# Patient Record
Sex: MALE | Race: WHITE | Employment: FULL TIME | URBAN - METROPOLITAN AREA
[De-identification: names, ages, dates, MRNs, and addresses within clinical notes are randomized per-mention and may not be internally consistent; named-entity substitution may affect disease eponyms.]

---

## 2017-02-28 PROBLEM — Z79.899 HIGH RISK MEDICATION USE: Status: ACTIVE | Noted: 2017-02-28

## 2017-02-28 PROBLEM — Z79.899 ENCOUNTER FOR MONITORING SUBOXONE MAINTENANCE THERAPY: Status: ACTIVE | Noted: 2017-02-28

## 2017-02-28 PROBLEM — Z51.81 ENCOUNTER FOR MONITORING SUBOXONE MAINTENANCE THERAPY: Status: ACTIVE | Noted: 2017-02-28

## 2021-07-25 ENCOUNTER — APPOINTMENT (OUTPATIENT)
Dept: GENERAL RADIOLOGY | Age: 61
End: 2021-07-25
Payer: COMMERCIAL

## 2021-07-25 ENCOUNTER — HOSPITAL ENCOUNTER (EMERGENCY)
Age: 61
Discharge: HOME | End: 2021-07-26
Attending: EMERGENCY MEDICINE
Payer: COMMERCIAL

## 2021-07-25 DIAGNOSIS — M79.89 LEG SWELLING: Primary | ICD-10-CM

## 2021-07-25 LAB
ABSOLUTE EOS #: 0.1 K/UL (ref 0–0.4)
ABSOLUTE IMMATURE GRANULOCYTE: ABNORMAL K/UL (ref 0–0.3)
ABSOLUTE LYMPH #: 1.9 K/UL (ref 1–4.8)
ABSOLUTE MONO #: 1.5 K/UL (ref 0.1–1.3)
ALBUMIN SERPL-MCNC: 3.2 G/DL (ref 3.5–5.2)
ALBUMIN/GLOBULIN RATIO: ABNORMAL (ref 1–2.5)
ALP BLD-CCNC: 99 U/L (ref 40–129)
ALT SERPL-CCNC: 23 U/L (ref 5–41)
ANION GAP SERPL CALCULATED.3IONS-SCNC: 11 MMOL/L (ref 9–17)
AST SERPL-CCNC: 17 U/L
BASOPHILS # BLD: 1 % (ref 0–2)
BASOPHILS ABSOLUTE: 0.1 K/UL (ref 0–0.2)
BILIRUB SERPL-MCNC: 0.21 MG/DL (ref 0.3–1.2)
BNP INTERPRETATION: NORMAL
BUN BLDV-MCNC: 14 MG/DL (ref 8–23)
BUN/CREAT BLD: ABNORMAL (ref 9–20)
CALCIUM SERPL-MCNC: 8.8 MG/DL (ref 8.6–10.4)
CHLORIDE BLD-SCNC: 101 MMOL/L (ref 98–107)
CO2: 25 MMOL/L (ref 20–31)
CREAT SERPL-MCNC: 0.59 MG/DL (ref 0.7–1.2)
DIFFERENTIAL TYPE: ABNORMAL
EOSINOPHILS RELATIVE PERCENT: 1 % (ref 0–4)
GFR AFRICAN AMERICAN: >60 ML/MIN
GFR NON-AFRICAN AMERICAN: >60 ML/MIN
GFR SERPL CREATININE-BSD FRML MDRD: ABNORMAL ML/MIN/{1.73_M2}
GFR SERPL CREATININE-BSD FRML MDRD: ABNORMAL ML/MIN/{1.73_M2}
GLUCOSE BLD-MCNC: 112 MG/DL (ref 70–99)
HCT VFR BLD CALC: 34.1 % (ref 41–53)
HEMOGLOBIN: 11.5 G/DL (ref 13.5–17.5)
IMMATURE GRANULOCYTES: ABNORMAL %
LYMPHOCYTES # BLD: 19 % (ref 24–44)
MAGNESIUM: 1.8 MG/DL (ref 1.6–2.6)
MCH RBC QN AUTO: 31.2 PG (ref 26–34)
MCHC RBC AUTO-ENTMCNC: 33.6 G/DL (ref 31–37)
MCV RBC AUTO: 92.8 FL (ref 80–100)
MONOCYTES # BLD: 14 % (ref 1–7)
NRBC AUTOMATED: ABNORMAL PER 100 WBC
PDW BLD-RTO: 13.1 % (ref 11.5–14.9)
PLATELET # BLD: 514 K/UL (ref 150–450)
PLATELET ESTIMATE: ABNORMAL
PMV BLD AUTO: 7.4 FL (ref 6–12)
POTASSIUM SERPL-SCNC: 4.1 MMOL/L (ref 3.7–5.3)
PRO-BNP: 222 PG/ML
RBC # BLD: 3.68 M/UL (ref 4.5–5.9)
RBC # BLD: ABNORMAL 10*6/UL
SEG NEUTROPHILS: 65 % (ref 36–66)
SEGMENTED NEUTROPHILS ABSOLUTE COUNT: 6.8 K/UL (ref 1.3–9.1)
SODIUM BLD-SCNC: 137 MMOL/L (ref 135–144)
TOTAL PROTEIN: 6.7 G/DL (ref 6.4–8.3)
TROPONIN INTERP: NORMAL
TROPONIN T: NORMAL NG/ML
TROPONIN, HIGH SENSITIVITY: 7 NG/L (ref 0–22)
WBC # BLD: 10.5 K/UL (ref 3.5–11)
WBC # BLD: ABNORMAL 10*3/UL

## 2021-07-25 PROCEDURE — 99284 EMERGENCY DEPT VISIT MOD MDM: CPT

## 2021-07-25 PROCEDURE — 36415 COLL VENOUS BLD VENIPUNCTURE: CPT

## 2021-07-25 PROCEDURE — 83735 ASSAY OF MAGNESIUM: CPT

## 2021-07-25 PROCEDURE — 83880 ASSAY OF NATRIURETIC PEPTIDE: CPT

## 2021-07-25 PROCEDURE — 80053 COMPREHEN METABOLIC PANEL: CPT

## 2021-07-25 PROCEDURE — 84484 ASSAY OF TROPONIN QUANT: CPT

## 2021-07-25 PROCEDURE — 71045 X-RAY EXAM CHEST 1 VIEW: CPT

## 2021-07-25 PROCEDURE — 93005 ELECTROCARDIOGRAM TRACING: CPT | Performed by: STUDENT IN AN ORGANIZED HEALTH CARE EDUCATION/TRAINING PROGRAM

## 2021-07-25 PROCEDURE — 96372 THER/PROPH/DIAG INJ SC/IM: CPT

## 2021-07-25 PROCEDURE — 85025 COMPLETE CBC W/AUTO DIFF WBC: CPT

## 2021-07-25 PROCEDURE — 81003 URINALYSIS AUTO W/O SCOPE: CPT

## 2021-07-25 PROCEDURE — 6370000000 HC RX 637 (ALT 250 FOR IP): Performed by: STUDENT IN AN ORGANIZED HEALTH CARE EDUCATION/TRAINING PROGRAM

## 2021-07-25 RX ORDER — CYCLOBENZAPRINE HCL 10 MG
10 TABLET ORAL ONCE
Status: COMPLETED | OUTPATIENT
Start: 2021-07-25 | End: 2021-07-25

## 2021-07-25 RX ADMIN — CYCLOBENZAPRINE 10 MG: 10 TABLET, FILM COATED ORAL at 23:45

## 2021-07-25 ASSESSMENT — PAIN SCALES - GENERAL: PAINLEVEL_OUTOF10: 8

## 2021-07-25 ASSESSMENT — ENCOUNTER SYMPTOMS: SHORTNESS OF BREATH: 1

## 2021-07-26 ENCOUNTER — APPOINTMENT (OUTPATIENT)
Dept: CT IMAGING | Age: 61
End: 2021-07-26
Payer: COMMERCIAL

## 2021-07-26 ENCOUNTER — HOSPITAL ENCOUNTER (OUTPATIENT)
Dept: VASCULAR LAB | Age: 61
Discharge: HOME | End: 2021-07-26
Payer: COMMERCIAL

## 2021-07-26 VITALS
WEIGHT: 140 LBS | BODY MASS INDEX: 22.5 KG/M2 | DIASTOLIC BLOOD PRESSURE: 94 MMHG | HEART RATE: 116 BPM | OXYGEN SATURATION: 94 % | HEIGHT: 66 IN | RESPIRATION RATE: 22 BRPM | SYSTOLIC BLOOD PRESSURE: 146 MMHG | TEMPERATURE: 97.9 F

## 2021-07-26 LAB
BILIRUBIN URINE: NEGATIVE
COLOR: YELLOW
COMMENT UA: ABNORMAL
GLUCOSE URINE: NEGATIVE
KETONES, URINE: NEGATIVE
LEUKOCYTE ESTERASE, URINE: NEGATIVE
NITRITE, URINE: NEGATIVE
PH UA: 7.5 (ref 5–8)
PROTEIN UA: NEGATIVE
SPECIFIC GRAVITY UA: 1.04 (ref 1–1.03)
TROPONIN INTERP: NORMAL
TROPONIN T: NORMAL NG/ML
TROPONIN, HIGH SENSITIVITY: 7 NG/L (ref 0–22)
TURBIDITY: CLEAR
URINE HGB: NEGATIVE
UROBILINOGEN, URINE: NORMAL

## 2021-07-26 PROCEDURE — 71260 CT THORAX DX C+: CPT

## 2021-07-26 PROCEDURE — 2580000003 HC RX 258: Performed by: STUDENT IN AN ORGANIZED HEALTH CARE EDUCATION/TRAINING PROGRAM

## 2021-07-26 PROCEDURE — 6360000004 HC RX CONTRAST MEDICATION: Performed by: STUDENT IN AN ORGANIZED HEALTH CARE EDUCATION/TRAINING PROGRAM

## 2021-07-26 PROCEDURE — 93970 EXTREMITY STUDY: CPT

## 2021-07-26 PROCEDURE — 84484 ASSAY OF TROPONIN QUANT: CPT

## 2021-07-26 PROCEDURE — 36415 COLL VENOUS BLD VENIPUNCTURE: CPT

## 2021-07-26 PROCEDURE — 6360000002 HC RX W HCPCS: Performed by: STUDENT IN AN ORGANIZED HEALTH CARE EDUCATION/TRAINING PROGRAM

## 2021-07-26 RX ORDER — SODIUM CHLORIDE 0.9 % (FLUSH) 0.9 %
10 SYRINGE (ML) INJECTION PRN
Status: DISCONTINUED | OUTPATIENT
Start: 2021-07-26 | End: 2021-07-26 | Stop reason: HOSPADM

## 2021-07-26 RX ORDER — 0.9 % SODIUM CHLORIDE 0.9 %
80 INTRAVENOUS SOLUTION INTRAVENOUS ONCE
Status: COMPLETED | OUTPATIENT
Start: 2021-07-26 | End: 2021-07-26

## 2021-07-26 RX ADMIN — IOPAMIDOL 75 ML: 755 INJECTION, SOLUTION INTRAVENOUS at 00:27

## 2021-07-26 RX ADMIN — SODIUM CHLORIDE, PRESERVATIVE FREE 10 ML: 5 INJECTION INTRAVENOUS at 00:27

## 2021-07-26 RX ADMIN — ENOXAPARIN SODIUM 60 MG: 60 INJECTION SUBCUTANEOUS at 01:19

## 2021-07-26 RX ADMIN — SODIUM CHLORIDE 80 ML: 9 INJECTION, SOLUTION INTRAVENOUS at 00:27

## 2021-07-26 ASSESSMENT — ENCOUNTER SYMPTOMS
ABDOMINAL PAIN: 0
NAUSEA: 0
PHOTOPHOBIA: 0
VOMITING: 0

## 2021-07-26 NOTE — ED PROVIDER NOTES
[address] ED  Emergency Department Encounter  Emergency Medicine Resident     Pt Name: Thuan Williamson  MRN: [id number]  Silveriogfclary 1960  Date of evaluation: 7/25/21  PCP:  No primary care provider on file. CHIEF COMPLAINT       Chief Complaint   Patient presents with    Muscle Pain    Swelling       HISTORY OFPRESENT ILLNESS  (Location/Symptom, Timing/Onset, Context/Setting, Quality, Duration, Modifying Factors,Severity.)      Thuan Williamson is a 57 y. o.yo male who presents with upper and lower extremity muscle aches and swelling. Patient states that symptoms are predominantly on his left side. He noticed that symptoms times ongoing for weeks now however has progressively worsened. He denies any cardiac history. States he does not take any medication daily. He did state that he had shortness of breath couple of days ago, which is what led him to come to the emergency room for further work-up. Patient states that he did have pain over his left knee on the posterior side. He denies any chest pain, fever, chills, or recent weight loss. PAST MEDICAL / SURGICAL / SOCIAL / FAMILY HISTORY      has no past medical history on file. has no past surgical history on file.      Social History     Socioeconomic History    Marital status:      Spouse name: Not on file    Number of children: Not on file    Years of education: Not on file    Highest education level: Not on file   Occupational History    Not on file   Tobacco Use    Smoking status: Current Every Day Smoker   Substance and Sexual Activity    Alcohol use: Not on file    Drug use: Not on file    Sexual activity: Not on file   Other Topics Concern    Not on file   Social History Narrative    Not on file     Social Determinants of Health     Financial Resource Strain:     Difficulty of Paying Living Expenses:    Food Insecurity:     Worried About Running Out of Food in the Last Year:     [address] in the Last Year:    Transportation Needs:     Lack of Transportation (Medical):  Lack of Transportation (Non-Medical):    Physical Activity:     Days of Exercise per Week:     Minutes of Exercise per Session:    Stress:     Feeling of Stress :    Social Connections:     Frequency of Communication with Friends and Family:     Frequency of Social Gatherings with Friends and Family:     Attends Restoration Services:     Active Member of Clubs or Organizations:     Attends Club or Organization Meetings:     Marital Status:    Intimate Partner Violence:     Fear of Current or Ex-Partner:     Emotionally Abused:     Physically Abused:     Sexually Abused:        History reviewed. No pertinent family history. Allergies:  Patient has no known allergies. Home Medications:  Prior to Admission medications    Medication Sig Start Date End Date Taking? Authorizing Provider   buprenorphine-naloxone (SUBOXONE) 8-2 MG SUBL SL tablet Take 3/4 tablet under the tongue once daily as directed 4/26/17   Dayton Chester MD   [facility] 17 MCG/ACT inhaler  1/26/17   Historical Provider, MD       REVIEW OFSYSTEMS    (2-9 systems for level 4, 10 or more for level 5)      Review of Systems   Constitutional: Negative for fatigue and fever. Eyes: Negative for photophobia and visual disturbance. Respiratory: Positive for shortness of breath. Cardiovascular: Positive for leg swelling. Gastrointestinal: Negative for abdominal pain, nausea and vomiting. Genitourinary: Negative for dysuria, hematuria and urgency. Musculoskeletal: Negative for joint swelling and neck stiffness. Skin: Negative for rash and wound. Psychiatric/Behavioral: Negative for behavioral problems and confusion.        PHYSICAL EXAM   (up to 7 for level 4, 8 or more forlevel 5)      INITIAL VITALS:   ED Triage Vitals [07/1960]   BP Temp Temp Source Pulse Resp SpO2 Height Weight   136/70 97.9 °F (36.6 °C) Temporal 107 18 95 % 5' 6\" (1.676 consider pulmonary embolism. Will obtain CT PE study. Also do cardiac work-up including EKG, troponin. Will assess for new onset heart failure, although unlikely given that he only had unilateral leg swelling. Will obtain chest x-ray to assess for pneumonia given the shortness of breath. He complained of muscle weakness, will also obtain electrolytes level to assess for electrolyte imbalance. Patient disposition is pending his labs and image. DIAGNOSTIC RESULTS / EMERGENCYDEPARTMENT COURSE / MDM     LABS:  Labs Reviewed   CBC WITH AUTO DIFFERENTIAL - Abnormal; Notable for the following components:       Result Value    RBC 3.68 (*)     Hemoglobin 11.5 (*)     Hematocrit 34.1 (*)     Platelets 582 (*)     Lymphocytes 19 (*)     Monocytes 14 (*)     Absolute Mono # 1.50 (*)     All other components within normal limits   COMPREHENSIVE METABOLIC PANEL - Abnormal; Notable for the following components:    Glucose 112 (*)     CREATININE 0.59 (*)     Total Bilirubin 0.21 (*)     Albumin 3.2 (*)     All other components within normal limits   BRAIN NATRIURETIC PEPTIDE   MAGNESIUM   TROPONIN   URINALYSIS   TROPONIN         RADIOLOGY:  XR CHEST PORTABLE    Result Date: 7/25/2021  EXAMINATION: ONE XRAY VIEW OF THE CHEST 7/25/2021 10:19 pm COMPARISON: None. HISTORY: ORDERING SYSTEM PROVIDED HISTORY: shortness of breath TECHNOLOGIST PROVIDED HISTORY: shortness of breath Reason for Exam: shortness of breath, leg swelling Acuity: Acute Type of Exam: Initial Additional signs and symptoms: shortness of breath, leg swelling Relevant Medical/Surgical History: shortness of breath, leg swelling FINDINGS: Lungs are hyperaerated. Heart and mediastinum normal.  Bony thorax intact.      COPD     CT CHEST PULMONARY EMBOLISM W CONTRAST    Result Date: 7/26/2021  EXAMINATION: CTA OF THE CHEST 7/26/2021 12:01 am TECHNIQUE: CTA of the chest was performed after the administration of intravenous contrast.  Multiplanar reformatted images are provided for review. MIP images are provided for review. Dose modulation, iterative reconstruction, and/or weight based adjustment of the mA/kV was utilized to reduce the radiation dose to as low as reasonably achievable. COMPARISON: Chest portable July 25, 2021. HISTORY: ORDERING SYSTEM PROVIDED HISTORY: tachy with leg swelling TECHNOLOGIST PROVIDED HISTORY: tachy with leg swelling Decision Support Exception - unselect if not a suspected or confirmed emergency medical condition->Emergency Medical Condition (MA) Reason for Exam: Tachy with leg swelling Acuity: Unknown Type of Exam: Unknown Additional signs and symptoms: Pt c/o swelling and tightness all over for 3 weeks. FINDINGS: Pulmonary Arteries: Pulmonary arteries are adequately opacified for evaluation. No evidence of intraluminal filling defect to suggest pulmonary embolism. Main pulmonary artery is normal in caliber. Mediastinum: No evidence of mediastinal lymphadenopathy. The heart and pericardium demonstrate no acute abnormality. There is no acute abnormality of the thoracic aorta. Lungs/pleura: The lungs are without acute process. Bilateral upper lung moderate centrilobular emphysema is present. No focal consolidation or pulmonary edema. No evidence of pleural effusion or pneumothorax. Upper Abdomen: Limited images of the upper abdomen are unremarkable. Soft Tissues/Bones: No acute bone or soft tissue abnormality. No evidence of pulmonary embolism or acute pulmonary abnormality. Moderate bilateral upper lung centrilobular emphysema. EKG  EKG Interpretation    Interpreted by me    Rhythm: normal sinus   Rate: Tachycardia  Axis: normal  Ectopy: none  Conduction: normal  ST Segments: no acute change  T Waves: no acute change  Q Waves: none    Clinical Impression: Sinus tachycardia, . No ST elevation noted.     All EKG's are interpreted by the Emergency Department Physicianwho either signs or Co-signs this chart in the absence of a cardiologist.    EMERGENCY DEPARTMENT COURSE:  ED Course as of Jul 26 0116   Mon Jul 26, 2021   0050 CT Chest shows no pulmonary embolism. Chest X-ray negative for infiltrates. I discussed with patient that we do not have ultrasound tech to do Doppler studies to assess for DVT. However he can be treated with Lovenox and come back tomorrow to get Doppler studies. Patient agreed with plan. I also discussed with patient that because Lovenox is a blood thinner, he has to refrain from any activity that would increase his chances of falling and does hitting his head and thus having a brain bleed. Patient and significant other in the room expresses understanding.    [AN]      ED Course User Index  [AN] Reno Lal MD          PROCEDURES:  None    CONSULTS:  None    CRITICAL CARE:      FINAL IMPRESSION      1.  Leg swelling          DISPOSITION / PLAN     DISPOSITION Discharge - Pending Orders Complete 07/26/2021 12:55:01 AM      PATIENT REFERRED TO:  [facility] ED  [address]    Please return tomorrow for doppler studies      DISCHARGE MEDICATIONS:  New Prescriptions    No medications on file       Reno Lal MD  Emergency Medicine Resident    (Please note that portions of this note were completed with a voice recognition program.Efforts were made to edit the dictations but occasionally words are mis-transcribed.)        Reno Lal MD  Resident  07/26/21 8743

## 2021-07-26 NOTE — ED PROVIDER NOTES
[facility] ED     Emergency Department     Faculty Attestation        I performed a history and physical examination of the patient and discussed management with the resident. I reviewed the residents note and agree with the documented findings and plan of care. Any areas of disagreement are noted on the chart. I was personally present for the key portions of any procedures. I have documented in the chart those procedures where I was not present during the key portions. I have reviewed the emergency nurses triage note. I agree with the chief complaint, past medical history, past surgical history, allergies, medications, social and family history as documented unless otherwise noted below. Documentation of the HPI, Physical Exam and Medical Decision Making performed by medical students or scribes is based on my personal performance of the HPI, PE and MDM. For Physician Assistant/ Nurse Practitioner cases/documentation I have have had a face to face evaluation with this patient and have completed at least one if not all key elements of the E/M (history, physical exam, and MDM). Additional findings are as noted.     Pertinent Comments       (Please note that portions of this note were completed with a voice recognition program.  Efforts were made to edit the dictations but occasionally words are mis-transcribed.)    Vale Mccann DO  Attending Emergency Physician         Vale Mccann DO  07/25/21 4163

## 2021-07-28 LAB
EKG ATRIAL RATE: 105 BPM
EKG P AXIS: 77 DEGREES
EKG P-R INTERVAL: 126 MS
EKG Q-T INTERVAL: 340 MS
EKG QRS DURATION: 98 MS
EKG QTC CALCULATION (BAZETT): 449 MS
EKG R AXIS: 85 DEGREES
EKG T AXIS: 73 DEGREES
EKG VENTRICULAR RATE: 105 BPM

## 2021-07-28 PROCEDURE — 93010 ELECTROCARDIOGRAM REPORT: CPT | Performed by: INTERNAL MEDICINE

## 2021-08-21 ENCOUNTER — HOSPITAL ENCOUNTER (EMERGENCY)
Age: 61
Discharge: HOME | End: 2021-08-21
Attending: EMERGENCY MEDICINE
Payer: COMMERCIAL

## 2021-08-21 VITALS
RESPIRATION RATE: 18 BRPM | TEMPERATURE: 98.1 F | HEART RATE: 106 BPM | DIASTOLIC BLOOD PRESSURE: 74 MMHG | OXYGEN SATURATION: 97 % | SYSTOLIC BLOOD PRESSURE: 115 MMHG

## 2021-08-21 DIAGNOSIS — M13.0 POLYARTICULAR ARTHRITIS: Primary | ICD-10-CM

## 2021-08-21 PROCEDURE — 99283 EMERGENCY DEPT VISIT LOW MDM: CPT

## 2021-08-21 PROCEDURE — 6360000002 HC RX W HCPCS: Performed by: STUDENT IN AN ORGANIZED HEALTH CARE EDUCATION/TRAINING PROGRAM

## 2021-08-21 PROCEDURE — 6370000000 HC RX 637 (ALT 250 FOR IP): Performed by: STUDENT IN AN ORGANIZED HEALTH CARE EDUCATION/TRAINING PROGRAM

## 2021-08-21 RX ORDER — NAPROXEN 250 MG/1
250 TABLET ORAL 2 TIMES DAILY WITH MEALS
Qty: 180 TABLET | Refills: 1 | Status: SHIPPED | OUTPATIENT
Start: 2021-08-21

## 2021-08-21 RX ORDER — METHYLPREDNISOLONE 4 MG/1
4 TABLET ORAL ONCE
Status: COMPLETED | OUTPATIENT
Start: 2021-08-21 | End: 2021-08-21

## 2021-08-21 RX ORDER — METHYLPREDNISOLONE 4 MG/1
TABLET ORAL
Qty: 1 KIT | Refills: 0 | Status: SHIPPED | OUTPATIENT
Start: 2021-08-21 | End: 2021-08-27

## 2021-08-21 RX ORDER — ACETAMINOPHEN 500 MG
1000 TABLET ORAL 4 TIMES DAILY PRN
Qty: 30 TABLET | Refills: 1 | Status: SHIPPED | OUTPATIENT
Start: 2021-08-21

## 2021-08-21 RX ORDER — ACETAMINOPHEN 500 MG
1000 TABLET ORAL ONCE
Status: COMPLETED | OUTPATIENT
Start: 2021-08-21 | End: 2021-08-21

## 2021-08-21 RX ADMIN — ACETAMINOPHEN 1000 MG: 500 TABLET ORAL at 13:51

## 2021-08-21 RX ADMIN — METHYLPREDNISOLONE 4 MG: 4 TABLET ORAL at 14:47

## 2021-08-21 ASSESSMENT — PAIN SCALES - GENERAL
PAINLEVEL_OUTOF10: 9
PAINLEVEL_OUTOF10: 8

## 2021-08-21 ASSESSMENT — ENCOUNTER SYMPTOMS
SORE THROAT: 0
COUGH: 0
DIARRHEA: 0
ABDOMINAL PAIN: 0
SHORTNESS OF BREATH: 0
NAUSEA: 0
VOMITING: 0
RHINORRHEA: 0

## 2021-08-21 ASSESSMENT — PAIN - FUNCTIONAL ASSESSMENT: PAIN_FUNCTIONAL_ASSESSMENT: PREVENTS OR INTERFERES WITH MANY ACTIVE NOT PASSIVE ACTIVITIES

## 2021-08-21 ASSESSMENT — PAIN DESCRIPTION - FREQUENCY: FREQUENCY: CONTINUOUS

## 2021-08-21 ASSESSMENT — PAIN DESCRIPTION - PAIN TYPE: TYPE: ACUTE PAIN

## 2021-08-21 ASSESSMENT — PAIN DESCRIPTION - DESCRIPTORS: DESCRIPTORS: ACHING;SHOOTING

## 2021-08-21 NOTE — ED NOTES
Pt to ed with wife from home. Pt states for several weeks he has been dealing with hand, shoulder, knee, feet swelling and pain. Pt states he has been seen at several facilities without any answers, is established with new pcp and has upcoming appointment.  Pt states the swelling and pain are increasing and keeping him up at night so they came for a second opinion     Macrina Bermeo RN  08/21/21 8163

## 2021-08-21 NOTE — ED PROVIDER NOTES
[facility] ED     Emergency Department     Faculty Attestation    I performed a history and physical examination of the patient and discussed management with the resident. I reviewed the residents note and agree with the documented findings and plan of care. Any areas of disagreement are noted on the chart. I was personally present for the key portions of any procedures. I have documented in the chart those procedures where I was not present during the key portions. I have reviewed the emergency nurses triage note. I agree with the chief complaint, past medical history, past surgical history, allergies, medications, social and family history as documented unless otherwise noted below. For Physician Assistant/ Nurse Practitioner cases/documentation I have personally evaluated this patient and have completed at least one if not all key elements of the E/M (history, physical exam, and MDM). Additional findings are as noted. Patient presents with swelling to his right hand. He denies any injury. He says that just started over the past day or so. He says he has been having swelling and pain to multiple joints that will occur and then improve and then another joint will swell and have pain. He says this has been going on since July 5. He denies any recent fever, cough, chest pain, shortness breath, abdominal pain, vomiting or diarrhea. He denies any rashes. He denies any problems using the restroom. Patient says he does have an appointment with his primary care physician this week. On exam, patient is sitting on the side the bed and appears well. Lungs clear to auscultation bilaterally heart sounds are normal.  There is moderate edema to the right hand without erythema or warmth. I believe patient most likely has a rheumatologic process ongoing.   Will treat patient's pain here and prescribed a steroid burst.  Will have patient follow-up with his primary care physician for further work-up.       Summer Woods MD  Attending Emergency  Physician              Caron Au MD  08/21/21 9820

## 2021-08-21 NOTE — ED PROVIDER NOTES
[address]  ED  Emergency Department Encounter  Emergency Medicine Resident     Pt Name: Josh Bowers  MRN: [id number]  [place]ary 1960  Date of evaluation: 8/21/21  PCP:  KAT Salmeron CNP    CHIEF COMPLAINT       Chief Complaint   Patient presents with    Other     swelling in both hands, both shoulder and arm pain        HISTORY OFPRESENT ILLNESS  (Location/Symptom, Timing/Onset, Context/Setting, Quality, Duration, Modifying Factors,Severity.)      Johs Bowers is a 57 y.o. male who presents with right hand swelling, right shoulder pain, left hand swelling. The symptoms have been ongoing for the past month, initially began as foot/ankle pain and swelling in the joint pain and swelling have migrated for the past month. He was seen in urgent care approximately a week ago, was given steroids and his symptoms improved, however his right hand is now quite swollen and painful. He works with his hands and has been having to call out of work due to the pain. No known medical history other than COPD, she is newly diagnosed. He does have a follow-up appointment scheduled with his primary care provider as well as an orthopedic surgeon in the next week but is unable to tolerate the pain at this time. He denies fever, chills, chest pain, abdominal pain, nausea or vomiting. No dysuria or penile discharge. PAST MEDICAL / SURGICAL / SOCIAL / FAMILY HISTORY      has no past medical history on file. has no past surgical history on file. Social:  reports that he has been smoking. He does not have any smokeless tobacco history on file. Family Hx: No family history on file. Allergies:  Patient has no known allergies. Home Medications:  Prior to Admission medications    Medication Sig Start Date End Date Taking?  Authorizing Provider   methylPREDNISolone (MEDROL, ROLAND,) 4 MG tablet Take by mouth. 8/21/21 8/27/21 Yes Lee Bales MD   naproxen (NAPROSYN) 250 MG tablet Take 1 tablet by mouth 2 times daily (with meals) 8/21/21  Yes Kevin Lind MD   acetaminophen (TYLENOL) 500 MG tablet Take 2 tablets by mouth 4 times daily as needed for Pain 8/21/21  Yes Kevin Lind MD   buprenorphine-naloxone (SUBOXONE) 8-2 MG SUBL SL tablet Take 3/4 tablet under the tongue once daily as directed 4/26/17   Esdras Lira MD   [facility] 17 MCG/ACT inhaler  1/26/17   Historical Provider, MD       REVIEW OFSYSTEMS    (2-9 systems for level 4, 10 or more for level 5)      Review of Systems   Constitutional: Negative for appetite change, chills, fatigue and fever. HENT: Negative for congestion, rhinorrhea, sneezing and sore throat. Eyes: Negative for visual disturbance. Respiratory: Negative for cough and shortness of breath. Cardiovascular: Negative for chest pain and leg swelling. Gastrointestinal: Negative for abdominal pain, diarrhea, nausea and vomiting. Genitourinary: Negative for dysuria. Musculoskeletal: Positive for arthralgias and joint swelling. Negative for myalgias, neck pain and neck stiffness. Skin: Negative for rash and wound. Neurological: Negative for dizziness, syncope, light-headedness and headaches. Psychiatric/Behavioral: Negative for dysphoric mood and suicidal ideas. PHYSICAL EXAM   (up to 7 for level 4, 8 or more forlevel 5)      INITIAL VITALS:   Vitals:    08/21/21 1336   BP:    Pulse: 106   Resp:    Temp:    SpO2:         Physical Exam  Vitals and nursing note reviewed. Constitutional:       General: He is not in acute distress. Appearance: Normal appearance. He is not ill-appearing or toxic-appearing. HENT:      Head: Normocephalic and atraumatic. Nose: Nose normal.      Mouth/Throat:      Mouth: Mucous membranes are moist.      Pharynx: Oropharynx is clear. Eyes:      Extraocular Movements: Extraocular movements intact.       Conjunctiva/sclera: Conjunctivae normal.      Pupils: Pupils are equal, round, and reactive to light. Cardiovascular:      Rate and Rhythm: Regular rhythm. Tachycardia present. Pulses: Normal pulses. Heart sounds: Normal heart sounds. Pulmonary:      Effort: Pulmonary effort is normal. No respiratory distress. Breath sounds: Normal breath sounds. No wheezing. Abdominal:      General: There is no distension. Palpations: Abdomen is soft. Tenderness: There is no abdominal tenderness. There is no guarding or rebound. Musculoskeletal:         General: Swelling present. Cervical back: Normal range of motion and neck supple. Comments: Right hand visibly swollen, radial pulse intact, cap refill less than 2 seconds. He has limited range of motion due to swelling and pain. Nonerythematous, no warmth. Left hand with limited range of motion secondary to swelling and pain. Right shoulder nonswollen but tender to palpation with limited range of motion due to pain. Not erythematous, no warmth. Neurological:      Mental Status: He is alert. DIFFERENTIAL  DIAGNOSIS     Initial MDM/Plan: 57 y.o. male who presents with migratory polyarticular arthritis that has been ongoing for the past several weeks. Chief complaint today is right hand swelling and right shoulder pain as well as left hand pain. Initially began in his ankles and symptoms have been migrating since. He was initially worked up at [address] [place] [address] for cardiac etiology and DVT, both of these studies were negative. On examination, he is tachycardic with pulse of 106 but he is afebrile and normotensive. He saturating 97% on room air. Upon chart review, he is frequently tachycardic with other visits. He is in no apparent distress. Right hand is visibly swollen but radial pulses palpable with normal cap refill. He has limited range of motion of the hand due to pain and swelling. No erythema or warmth.   His right shoulder is tender to palpation with limited range of motion, specifically with adduction of the shoulder. Again, no warmth or erythema noted. Left hand is also tender but less so than the right. Lower extremities are unremarkable. Abdomen is soft, nondistended nontender. Lungs clear to auscultation bilaterally. I suspect the patient has a autoimmune/rheumatological arthritis. He has outpatient follow-up with his primary care provider. Since steroids improved his symptoms last week, plan to give a Medrol pack for reduction of inflammation as well as Tylenol and naproxen to reduce the ibuprofen intake. DIAGNOSTIC RESULTS / EMERGENCYDEPARTMENT COURSE / MDM     LABS:  Labs Reviewed - No data to display      RADIOLOGY:  No results found. EMERGENCY DEPARTMENT COURSE:  ED Course as of Aug 21 1527   Sat Aug 21, 2021   1336 Discussed with patient that he likely has a autoimmune disorder and that he should follow-up with his primary care physician for further work-up. Also discussed that he should cut back on ibuprofen use and switch to naproxen instead. Patient indicated understanding, was provided 1 dose of methylprednisone in the emergency department    [JT]      ED Course User Index  [JT] Jess Hackett MD         PROCEDURES:  None    CONSULTS:  None      FINAL IMPRESSION      1.  Polyarticular arthritis          DISPOSITION / PLAN     DISPOSITION Decision To Discharge 08/21/2021 01:36:51 PM      PATIENT REFERRED TO:  Myla Winston, APRN - [address]  [phone]    In 2 days      [facility] ED  [address] [address]  [phone]  Go to   If symptoms worsen      DISCHARGE MEDICATIONS:  Discharge Medication List as of 8/21/2021  1:42 PM      START taking these medications    Details   methylPREDNISolone (MEDROL, ROLAND,) 4 MG tablet Take by mouth., Disp-1 kit, R-0Normal      naproxen (NAPROSYN) 250 MG tablet Take 1 tablet by mouth 2 times daily (with meals), Disp-180 tablet, R-1Normal      acetaminophen (TYLENOL) 500 MG tablet Take 2 tablets by mouth 4 times daily as needed for Pain, Disp-30 tablet, R-1Normal             Amy Weaver MD  Emergency Medicine Resident    (Please note that portions of this note were completed with a voice recognition program.Efforts were made to edit the dictations but occasionally words are mis-transcribed.)        Amy Weaver MD  Resident  08/21/21 5853